# Patient Record
Sex: FEMALE | Race: WHITE | NOT HISPANIC OR LATINO | Employment: OTHER | URBAN - METROPOLITAN AREA
[De-identification: names, ages, dates, MRNs, and addresses within clinical notes are randomized per-mention and may not be internally consistent; named-entity substitution may affect disease eponyms.]

---

## 2017-06-21 ENCOUNTER — PATIENT OUTREACH (OUTPATIENT)
Dept: ADMINISTRATIVE | Facility: HOSPITAL | Age: 67
End: 2017-06-21

## 2017-06-21 NOTE — LETTER
June 21, 2017    Erin Mccarty  [address] [address] [address]  [place] [address]             [facility]  [address]  [facility] [address]  Phone: [phone] Dear Mrs. Mccarty:    [facility] is committed to your overall health.  To help you get the most out of each of your visits, we will review your information to make sure you are up to date on all of your recommended tests and/or procedures.      We have Dr. Jonatan Marte listed as your primary care provider.  You have not been in to see him since January of 2015.  If Dr. Marte is no longer your primary care provider, please contact me so that we may update our records accordingly.      He has found that you may be due for an office visit with him, mammogram, colon cancer screening, osteoporosis screening, hepatitis C screening, and possibly some immunizations (pneumonia, shingles, and tetanus).     If you have had any of the above done at an outside facility, please let us know so I can update your record.  If you have a copy of these records, please provide a copy for us to scan into your chart.  If not, please provide that provider/facilities contact information so that we may obtain copies from that facility.     Otherwise, please schedule these appointments at your earliest convenience.     If you have any questions or concerns, please don't hesitate to call.    Thank you for letting us care for you,  Lamar Buckley LPN Clinical Care Coordinator  [facility] [place]  [phone]

## 2017-06-21 NOTE — PROGRESS NOTES
Overdue mammo report, due an office visit with him, mammogram, colon cancer screening, osteoporosis screening, hepatitis C screening, and possibly some immunizations (pneumonia, shingles, and tetanus)